# Patient Record
Sex: MALE | Race: WHITE | NOT HISPANIC OR LATINO
[De-identification: names, ages, dates, MRNs, and addresses within clinical notes are randomized per-mention and may not be internally consistent; named-entity substitution may affect disease eponyms.]

---

## 2017-02-10 ENCOUNTER — APPOINTMENT (OUTPATIENT)
Dept: OTOLARYNGOLOGY | Facility: CLINIC | Age: 47
End: 2017-02-10

## 2017-02-10 VITALS
DIASTOLIC BLOOD PRESSURE: 79 MMHG | SYSTOLIC BLOOD PRESSURE: 118 MMHG | WEIGHT: 215 LBS | HEART RATE: 80 BPM | BODY MASS INDEX: 27.59 KG/M2 | HEIGHT: 74 IN | TEMPERATURE: 97.7 F

## 2017-02-10 DIAGNOSIS — Z78.9 OTHER SPECIFIED HEALTH STATUS: ICD-10-CM

## 2017-02-10 DIAGNOSIS — Z80.3 FAMILY HISTORY OF MALIGNANT NEOPLASM OF BREAST: ICD-10-CM

## 2017-02-10 DIAGNOSIS — F17.210 NICOTINE DEPENDENCE, CIGARETTES, UNCOMPLICATED: ICD-10-CM

## 2017-02-10 DIAGNOSIS — Z80.7 FAMILY HISTORY OF OTHER MALIGNANT NEOPLASMS OF LYMPHOID, HEMATOPOIETIC AND RELATED TISSUES: ICD-10-CM

## 2017-02-10 DIAGNOSIS — Z80.42 FAMILY HISTORY OF MALIGNANT NEOPLASM OF PROSTATE: ICD-10-CM

## 2017-03-10 ENCOUNTER — APPOINTMENT (OUTPATIENT)
Dept: OTOLARYNGOLOGY | Facility: CLINIC | Age: 47
End: 2017-03-10

## 2017-03-10 VITALS
BODY MASS INDEX: 27.59 KG/M2 | TEMPERATURE: 97.6 F | HEART RATE: 75 BPM | SYSTOLIC BLOOD PRESSURE: 115 MMHG | DIASTOLIC BLOOD PRESSURE: 81 MMHG | HEIGHT: 74 IN | WEIGHT: 215 LBS

## 2017-03-28 ENCOUNTER — OUTPATIENT (OUTPATIENT)
Dept: OUTPATIENT SERVICES | Facility: HOSPITAL | Age: 47
LOS: 1 days | Discharge: HOME | End: 2017-03-28

## 2017-06-27 DIAGNOSIS — E03.9 HYPOTHYROIDISM, UNSPECIFIED: ICD-10-CM

## 2018-02-07 ENCOUNTER — APPOINTMENT (OUTPATIENT)
Dept: OTOLARYNGOLOGY | Facility: CLINIC | Age: 48
End: 2018-02-07
Payer: COMMERCIAL

## 2018-02-07 VITALS
WEIGHT: 215 LBS | BODY MASS INDEX: 27.59 KG/M2 | SYSTOLIC BLOOD PRESSURE: 116 MMHG | HEIGHT: 74 IN | DIASTOLIC BLOOD PRESSURE: 80 MMHG

## 2018-02-07 DIAGNOSIS — H91.93 UNSPECIFIED HEARING LOSS, BILATERAL: ICD-10-CM

## 2018-02-07 DIAGNOSIS — R22.0 LOCALIZED SWELLING, MASS AND LUMP, HEAD: ICD-10-CM

## 2018-02-07 PROCEDURE — 92557 COMPREHENSIVE HEARING TEST: CPT

## 2018-02-07 PROCEDURE — 99213 OFFICE O/P EST LOW 20 MIN: CPT | Mod: 25

## 2018-02-07 PROCEDURE — 92550 TYMPANOMETRY & REFLEX THRESH: CPT

## 2018-02-07 RX ORDER — CLOBETASOL PROPIONATE 0.5 MG/ML
0.05 SOLUTION TOPICAL
Qty: 50 | Refills: 0 | Status: ACTIVE | COMMUNITY
Start: 2017-09-12

## 2018-02-07 RX ORDER — MOMETASONE FUROATE 1 MG/G
0.1 CREAM TOPICAL
Qty: 45 | Refills: 0 | Status: ACTIVE | COMMUNITY
Start: 2017-09-12

## 2018-02-07 RX ORDER — LEVOTHYROXINE SODIUM 0.1 MG/1
100 TABLET ORAL
Qty: 30 | Refills: 0 | Status: ACTIVE | COMMUNITY
Start: 2017-10-31

## 2018-12-11 ENCOUNTER — OUTPATIENT (OUTPATIENT)
Dept: OUTPATIENT SERVICES | Facility: HOSPITAL | Age: 48
LOS: 1 days | Discharge: HOME | End: 2018-12-11

## 2018-12-12 DIAGNOSIS — Z00.00 ENCOUNTER FOR GENERAL ADULT MEDICAL EXAMINATION WITHOUT ABNORMAL FINDINGS: ICD-10-CM

## 2018-12-12 DIAGNOSIS — F17.220 NICOTINE DEPENDENCE, CHEWING TOBACCO, UNCOMPLICATED: ICD-10-CM

## 2018-12-12 DIAGNOSIS — H91.90 UNSPECIFIED HEARING LOSS, UNSPECIFIED EAR: ICD-10-CM

## 2018-12-12 DIAGNOSIS — E03.9 HYPOTHYROIDISM, UNSPECIFIED: ICD-10-CM

## 2019-05-28 ENCOUNTER — OUTPATIENT (OUTPATIENT)
Dept: OUTPATIENT SERVICES | Facility: HOSPITAL | Age: 49
LOS: 1 days | Discharge: HOME | End: 2019-05-28

## 2019-05-28 DIAGNOSIS — E78.1 PURE HYPERGLYCERIDEMIA: ICD-10-CM

## 2019-05-28 DIAGNOSIS — F17.200 NICOTINE DEPENDENCE, UNSPECIFIED, UNCOMPLICATED: ICD-10-CM

## 2019-05-28 DIAGNOSIS — E03.9 HYPOTHYROIDISM, UNSPECIFIED: ICD-10-CM

## 2022-09-20 ENCOUNTER — APPOINTMENT (OUTPATIENT)
Dept: ORTHOPEDIC SURGERY | Facility: CLINIC | Age: 52
End: 2022-09-20

## 2022-09-20 VITALS — WEIGHT: 208 LBS | BODY MASS INDEX: 26.69 KG/M2 | HEIGHT: 74 IN

## 2022-09-20 DIAGNOSIS — M20.011 MALLET FINGER OF RIGHT FINGER(S): ICD-10-CM

## 2022-09-20 PROCEDURE — 99202 OFFICE O/P NEW SF 15 MIN: CPT

## 2022-09-20 NOTE — ASSESSMENT
[FreeTextEntry1] :  Patient mallet injury of the right ring finger.  He is functioning well.  Id he did well with this treatment plan.  Minimal extensor lag is present.  It he will see me back on an as-needed basis.  Any other issues or problems contact the office otherwise he is discharged from care the swelling about the middle phalanx was discussed with the patient and slight extensor lag was discussed.

## 2022-09-20 NOTE — PHYSICAL EXAM
[de-identified] :   Patient is able to maintain reasonable extension of the right ring finger.  He has action flexion of the finger.  Mild swan-neck deformity is present.  Id he has no significant tenderness.  The nail plate is intact.  It slight swelling along the middle phalanx level

## 2022-09-20 NOTE — HISTORY OF PRESENT ILLNESS
[de-identified] : 52-year-old male sustained a injury to his right ring finger this injury occurred over 2 months he comes in the office for evaluation occurred while playing football he had a droop posture to the finger he treated it on his own with splinting and now he has no significant pain discomfort is feeling pretty good in the hand.

## 2022-09-22 ENCOUNTER — APPOINTMENT (OUTPATIENT)
Dept: ORTHOPEDIC SURGERY | Facility: CLINIC | Age: 52
End: 2022-09-22

## 2022-09-22 DIAGNOSIS — M25.861 OTHER SPECIFIED JOINT DISORDERS, RIGHT KNEE: ICD-10-CM

## 2022-09-22 PROCEDURE — 20612 ASPIRATE/INJ GANGLION CYST: CPT

## 2022-09-22 PROCEDURE — 99214 OFFICE O/P EST MOD 30 MIN: CPT | Mod: 25

## 2022-09-22 NOTE — HISTORY OF PRESENT ILLNESS
[de-identified] : right knee cyst\par previously saw dr solomon and had cyst aspirated, ganglion, but it has returned although not as large\par \par nad\par right knee\par cyst medial knee over mcl, soft, no erythema\par NVI\par comp soft and nt\par \par Plan\par went over findings\par explained the cyst and re occurance if aspirated\par r/b/a explained\par will proceed with right knee aspiration\par \par Under sterile conditions, 10 cc ganglion fluid asirated from medial knee, tolerated well, bandaid placed

## 2024-04-18 ENCOUNTER — APPOINTMENT (OUTPATIENT)
Dept: ORTHOPEDIC SURGERY | Facility: CLINIC | Age: 54
End: 2024-04-18
Payer: COMMERCIAL

## 2024-04-18 VITALS — HEIGHT: 74 IN | WEIGHT: 207 LBS | BODY MASS INDEX: 26.56 KG/M2

## 2024-04-18 DIAGNOSIS — S39.012A STRAIN OF MUSCLE, FASCIA AND TENDON OF LOWER BACK, INITIAL ENCOUNTER: ICD-10-CM

## 2024-04-18 DIAGNOSIS — M47.896 OTHER SPONDYLOSIS, LUMBAR REGION: ICD-10-CM

## 2024-04-18 PROCEDURE — 72110 X-RAY EXAM L-2 SPINE 4/>VWS: CPT

## 2024-04-18 PROCEDURE — 99213 OFFICE O/P EST LOW 20 MIN: CPT

## 2024-04-18 RX ORDER — CYCLOBENZAPRINE HYDROCHLORIDE 7.5 MG/1
7.5 TABLET, FILM COATED ORAL
Qty: 14 | Refills: 0 | Status: ACTIVE | COMMUNITY
Start: 2024-04-18 | End: 1900-01-01

## 2024-04-18 RX ORDER — MELOXICAM 15 MG/1
15 TABLET ORAL
Qty: 30 | Refills: 0 | Status: ACTIVE | COMMUNITY
Start: 2024-04-18 | End: 1900-01-01

## 2024-04-18 NOTE — IMAGING
[de-identified] : On examination no palpable tenderness, although he states when he has the pain is mainly around the left paraspinal muscle left gluteal region.  Negative straight leg raise bilaterally.  Good range of motion of the left and right hip.  5 out of 5 motor strength bilateral lower extremity.  Appears to ambulating well.  X-ray lumbar spine in the office today multilevel degenerative change no obvious acute bony abnormality

## 2024-04-18 NOTE — ASSESSMENT
[FreeTextEntry1] : Recommending physical therapy, heating pad.  Meloxicam and cyclobenzaprine were sent to the pharmacy.  Will schedule a follow-up appointment in 6 weeks for repeat evaluation in our spine department if the pain worsens or continues further imaging and/or injections may be warranted.

## 2024-04-18 NOTE — HISTORY OF PRESENT ILLNESS
[de-identified] : 53-year-old male comes in today for evaluation of his lower back pain that has been going on now for the past 6 weeks.  No specific injury or trauma.  He states that he was sitting in an awkward position for long period of time and is unsure if that is what started the pain.  Denies radicular symptoms.  No numbness or tingling.  Pain is intermittent.  He states he takes Advil with some relief.  No history of prior back pain.

## 2024-04-23 RX ORDER — CYCLOBENZAPRINE HYDROCHLORIDE 10 MG/1
10 TABLET, FILM COATED ORAL
Qty: 30 | Refills: 0 | Status: ACTIVE | COMMUNITY
Start: 2024-04-23 | End: 1900-01-01

## 2024-06-07 ENCOUNTER — APPOINTMENT (OUTPATIENT)
Dept: ORTHOPEDIC SURGERY | Facility: CLINIC | Age: 54
End: 2024-06-07

## 2024-08-07 ENCOUNTER — RX RENEWAL (OUTPATIENT)
Age: 54
End: 2024-08-07